# Patient Record
Sex: FEMALE | Race: WHITE | Employment: FULL TIME | URBAN - METROPOLITAN AREA
[De-identification: names, ages, dates, MRNs, and addresses within clinical notes are randomized per-mention and may not be internally consistent; named-entity substitution may affect disease eponyms.]

---

## 2019-07-31 ENCOUNTER — HOSPITAL ENCOUNTER (EMERGENCY)
Age: 39
Discharge: HOME | End: 2019-08-01
Attending: EMERGENCY MEDICINE
Payer: COMMERCIAL

## 2019-07-31 ENCOUNTER — APPOINTMENT (OUTPATIENT)
Dept: CT IMAGING | Age: 39
End: 2019-07-31
Attending: EMERGENCY MEDICINE
Payer: COMMERCIAL

## 2019-07-31 DIAGNOSIS — N83.8 OVARIAN MASS: Primary | ICD-10-CM

## 2019-07-31 DIAGNOSIS — R10.84 ABDOMINAL PAIN, GENERALIZED: ICD-10-CM

## 2019-07-31 LAB
ALBUMIN SERPL-MCNC: 3.8 G/DL (ref 3.5–5)
ALBUMIN/GLOB SERPL: 1 {RATIO} (ref 1.2–3.5)
ALP SERPL-CCNC: 87 U/L (ref 50–136)
ALT SERPL-CCNC: 34 U/L (ref 12–65)
ANION GAP SERPL CALC-SCNC: 9 MMOL/L (ref 7–16)
AST SERPL-CCNC: 30 U/L (ref 15–37)
BACTERIA URNS QL MICRO: 0 /HPF
BASOPHILS # BLD: 0.1 K/UL (ref 0–0.2)
BASOPHILS NFR BLD: 1 % (ref 0–2)
BILIRUB SERPL-MCNC: 0.4 MG/DL (ref 0.2–1.1)
BUN SERPL-MCNC: 10 MG/DL (ref 6–23)
CALCIUM SERPL-MCNC: 9.1 MG/DL (ref 8.3–10.4)
CASTS URNS QL MICRO: NORMAL /LPF
CHLORIDE SERPL-SCNC: 104 MMOL/L (ref 98–107)
CO2 SERPL-SCNC: 26 MMOL/L (ref 21–32)
CREAT SERPL-MCNC: 0.68 MG/DL (ref 0.6–1)
DIFFERENTIAL METHOD BLD: NORMAL
EOSINOPHIL # BLD: 0.1 K/UL (ref 0–0.8)
EOSINOPHIL NFR BLD: 1 % (ref 0.5–7.8)
EPI CELLS #/AREA URNS HPF: NORMAL /HPF
ERYTHROCYTE [DISTWIDTH] IN BLOOD BY AUTOMATED COUNT: 12 % (ref 11.9–14.6)
GLOBULIN SER CALC-MCNC: 4 G/DL (ref 2.3–3.5)
GLUCOSE SERPL-MCNC: 79 MG/DL (ref 65–100)
HCG UR QL: NEGATIVE
HCT VFR BLD AUTO: 42.6 % (ref 35.8–46.3)
HGB BLD-MCNC: 14 G/DL (ref 11.7–15.4)
IMM GRANULOCYTES # BLD AUTO: 0.1 K/UL (ref 0–0.5)
IMM GRANULOCYTES NFR BLD AUTO: 1 % (ref 0–5)
LIPASE SERPL-CCNC: 77 U/L (ref 73–393)
LYMPHOCYTES # BLD: 3.2 K/UL (ref 0.5–4.6)
LYMPHOCYTES NFR BLD: 31 % (ref 13–44)
MCH RBC QN AUTO: 30 PG (ref 26.1–32.9)
MCHC RBC AUTO-ENTMCNC: 32.9 G/DL (ref 31.4–35)
MCV RBC AUTO: 91.2 FL (ref 79.6–97.8)
MONOCYTES # BLD: 0.8 K/UL (ref 0.1–1.3)
MONOCYTES NFR BLD: 8 % (ref 4–12)
MUCOUS THREADS URNS QL MICRO: 0 /LPF
NEUTS SEG # BLD: 6.1 K/UL (ref 1.7–8.2)
NEUTS SEG NFR BLD: 59 % (ref 43–78)
NRBC # BLD: 0 K/UL (ref 0–0.2)
PLATELET # BLD AUTO: 273 K/UL (ref 150–450)
PMV BLD AUTO: 11.5 FL (ref 9.4–12.3)
POTASSIUM SERPL-SCNC: 3.9 MMOL/L (ref 3.5–5.1)
PROT SERPL-MCNC: 7.8 G/DL (ref 6.3–8.2)
RBC # BLD AUTO: 4.67 M/UL (ref 4.05–5.2)
RBC #/AREA URNS HPF: NORMAL /HPF
SODIUM SERPL-SCNC: 139 MMOL/L (ref 136–145)
WBC # BLD AUTO: 10.3 K/UL (ref 4.3–11.1)
WBC URNS QL MICRO: NORMAL /HPF

## 2019-07-31 PROCEDURE — 81003 URINALYSIS AUTO W/O SCOPE: CPT | Performed by: EMERGENCY MEDICINE

## 2019-07-31 PROCEDURE — 83690 ASSAY OF LIPASE: CPT

## 2019-07-31 PROCEDURE — 81025 URINE PREGNANCY TEST: CPT

## 2019-07-31 PROCEDURE — 81015 MICROSCOPIC EXAM OF URINE: CPT

## 2019-07-31 PROCEDURE — 74177 CT ABD & PELVIS W/CONTRAST: CPT

## 2019-07-31 PROCEDURE — 99284 EMERGENCY DEPT VISIT MOD MDM: CPT | Performed by: EMERGENCY MEDICINE

## 2019-07-31 PROCEDURE — 80053 COMPREHEN METABOLIC PANEL: CPT

## 2019-07-31 PROCEDURE — 74011636320 HC RX REV CODE- 636/320: Performed by: EMERGENCY MEDICINE

## 2019-07-31 PROCEDURE — 85025 COMPLETE CBC W/AUTO DIFF WBC: CPT

## 2019-07-31 PROCEDURE — 74011000258 HC RX REV CODE- 258: Performed by: EMERGENCY MEDICINE

## 2019-07-31 RX ORDER — TRAMADOL HYDROCHLORIDE 50 MG/1
50 TABLET ORAL
Qty: 20 TAB | Refills: 0 | Status: SHIPPED | OUTPATIENT
Start: 2019-07-31 | End: 2019-08-05

## 2019-07-31 RX ORDER — SODIUM CHLORIDE 0.9 % (FLUSH) 0.9 %
10 SYRINGE (ML) INJECTION
Status: COMPLETED | OUTPATIENT
Start: 2019-07-31 | End: 2019-07-31

## 2019-07-31 RX ADMIN — SODIUM CHLORIDE 100 ML: 900 INJECTION, SOLUTION INTRAVENOUS at 22:34

## 2019-07-31 RX ADMIN — Medication 10 ML: at 22:33

## 2019-07-31 RX ADMIN — IOPAMIDOL 100 ML: 755 INJECTION, SOLUTION INTRAVENOUS at 22:34

## 2019-08-01 VITALS
HEIGHT: 69 IN | BODY MASS INDEX: 31.1 KG/M2 | WEIGHT: 210 LBS | RESPIRATION RATE: 18 BRPM | HEART RATE: 80 BPM | TEMPERATURE: 98.6 F | OXYGEN SATURATION: 97 % | DIASTOLIC BLOOD PRESSURE: 76 MMHG | SYSTOLIC BLOOD PRESSURE: 123 MMHG

## 2019-08-01 NOTE — ED NOTES
I have reviewed discharge instructions with the patient and spouse. The patient and spouse verbalized understanding. Patient left ED via Discharge Method: ambulatory to Home with spouse    Opportunity for questions and clarification provided. Patient given 1 scripts. To continue your aftercare when you leave the hospital, you may receive an automated call from our care team to check in on how you are doing. This is a free service and part of our promise to provide the best care and service to meet your aftercare needs.  If you have questions, or wish to unsubscribe from this service please call [phone]. Thank you for Choosing our [facility] Emergency Department.

## 2019-08-01 NOTE — ED TRIAGE NOTES
Pt arrived from home with  via POV with c/o abdominal pain since December, progressively worse, painful for two weeks, no seeking tx. Pt went to , told they couldn't determine cause of pain. Pt states abdomen bloated, pain in the upper quadrant, denies NVD, no issues with bowel habits, no pain on urination. No OTC tx, has been taking Symone back and body x 3 out of the last 5 days. No other medical hx.

## 2019-08-01 NOTE — ED PROVIDER NOTES
The history is provided by the patient and the spouse. Abdominal Pain    This is a new problem. The current episode started more than 1 week ago. The problem occurs daily. The problem has been gradually worsening. The pain is associated with an unknown factor. The pain is located in the epigastric region and RUQ. The quality of the pain is aching and sharp. The pain is at a severity of 7/10. Pertinent negatives include no anorexia, no fever, no belching, no diarrhea, no flatus, no hematochezia, no melena, no nausea, no vomiting, no constipation, no dysuria, no frequency, no hematuria, no headaches, no arthralgias, no myalgias, no trauma, no chest pain and no back pain. The pain is worsened by activity and palpation. The pain is relieved by nothing. Her past medical history is significant for ovarian cysts (Patient was told by her gynecologist she had a large ovarian cyst but they have been following for several years that would grow and get smaller. She has not had no follow-up in at least 5 years. ). Her past medical history does not include PUD, gallstones, GERD, ulcerative colitis, Crohn's disease, irritable bowel syndrome, cancer, UTI, pancreatitis, ectopic pregnancy, diverticulitis, atrial fibrillation, DM, kidney stones or small bowel obstruction. The patient's surgical history non-contributory. No past medical history on file.     Past Surgical History:   Procedure Laterality Date    HX OTHER SURGICAL      wisdom teeth         Family History:   Problem Relation Age of Onset    Diabetes Maternal Grandmother     Heart Disease Maternal Grandmother     Diabetes Maternal Grandfather     Heart Disease Maternal Grandfather     Diabetes Paternal Grandmother     Diabetes Paternal Grandfather        Social History     Socioeconomic History    Marital status:      Spouse name: Not on file    Number of children: Not on file    Years of education: Not on file    Highest education level: Not on file   Occupational History    Not on file   Social Needs    Financial resource strain: Not on file    Food insecurity:     Worry: Not on file     Inability: Not on file    Transportation needs:     Medical: Not on file     Non-medical: Not on file   Tobacco Use    Smoking status: Never Smoker    Smokeless tobacco: Never Used   Substance and Sexual Activity    Alcohol use: No    Drug use: No    Sexual activity: Yes     Partners: Male     Birth control/protection: None   Lifestyle    Physical activity:     Days per week: Not on file     Minutes per session: Not on file    Stress: Not on file   Relationships    Social connections:     Talks on phone: Not on file     Gets together: Not on file     Attends Sikhism service: Not on file     Active member of club or organization: Not on file     Attends meetings of clubs or organizations: Not on file     Relationship status: Not on file    Intimate partner violence:     Fear of current or ex partner: Not on file     Emotionally abused: Not on file     Physically abused: Not on file     Forced sexual activity: Not on file   Other Topics Concern    Not on file   Social History Narrative    Not on file         ALLERGIES: Latex and Sulfa (sulfonamide antibiotics)    Review of Systems   Constitutional: Negative for fever. Cardiovascular: Negative for chest pain, palpitations and leg swelling. Gastrointestinal: Positive for abdominal distention and abdominal pain. Negative for anorexia, blood in stool, constipation, diarrhea, flatus, hematochezia, melena, nausea and vomiting. Genitourinary: Negative for dysuria, frequency and hematuria. Musculoskeletal: Negative for arthralgias, back pain and myalgias. Neurological: Negative for headaches. All other systems reviewed and are negative.       Vitals:    07/31/19 2039 07/31/19 2257 07/31/19 2259   BP: (!) 163/94 144/73 135/73   Pulse: 100 87 94   Resp: 20     Temp: 98.5 °F (36.9 °C)     SpO2: 99% 99% 97% Weight: 95.3 kg (210 lb)     Height: 5' 9\" (1.753 m)              Physical Exam   Constitutional: She is oriented to person, place, and time. She appears well-developed and well-nourished. No distress. HENT:   Head: Normocephalic and atraumatic. Right Ear: External ear normal.   Left Ear: External ear normal.   Mouth/Throat: Oropharynx is clear and moist.   Eyes: Pupils are equal, round, and reactive to light. Conjunctivae and EOM are normal.   Neck: Normal range of motion. Neck supple. Cardiovascular: Normal rate, regular rhythm, normal heart sounds and intact distal pulses. Pulmonary/Chest: Effort normal and breath sounds normal.   Abdominal: Soft. Bowel sounds are normal. She exhibits distension and mass. She exhibits no shifting dullness, no pulsatile liver, no fluid wave, no abdominal bruit, no ascites and no pulsatile midline mass. There is no hepatosplenomegaly. There is tenderness in the right upper quadrant and epigastric area. There is no rigidity, no rebound, no guarding, no CVA tenderness, no tenderness at McBurney's point and negative Mcadams's sign. No hernia. Musculoskeletal: Normal range of motion. She exhibits no edema. Neurological: She is alert and oriented to person, place, and time. Skin: Skin is warm and dry. Capillary refill takes less than 2 seconds. Psychiatric: She has a normal mood and affect. Nursing note and vitals reviewed.        MDM  Number of Diagnoses or Management Options  Ovarian mass: new and requires workup     Amount and/or Complexity of Data Reviewed  Clinical lab tests: ordered and reviewed  Tests in the radiology section of CPT®: ordered and reviewed  Review and summarize past medical records: yes  Discuss the patient with other providers: yes (Discussed findings with Dr. Nils Hunter of gynecology; he will notify the office to expect the patient's call in the morning and arrange follow-up.)  Independent visualization of images, tracings, or specimens: yes    Risk of Complications, Morbidity, and/or Mortality  Presenting problems: high  Diagnostic procedures: moderate  Management options: moderate    Patient Progress  Patient progress: stable         Procedures        Results Reviewed:      Recent Results (from the past 24 hour(s))   CBC WITH AUTOMATED DIFF    Collection Time: 07/31/19  8:47 PM   Result Value Ref Range    WBC 10.3 4.3 - 11.1 K/uL    RBC 4.67 4.05 - 5.2 M/uL    HGB 14.0 11.7 - 15.4 g/dL    HCT 42.6 35.8 - 46.3 %    MCV 91.2 79.6 - 97.8 FL    MCH 30.0 26.1 - 32.9 PG    MCHC 32.9 31.4 - 35.0 g/dL    RDW 12.0 11.9 - 14.6 %    PLATELET 251 798 - 596 K/uL    MPV 11.5 9.4 - 12.3 FL    ABSOLUTE NRBC 0.00 0.0 - 0.2 K/uL    DF AUTOMATED      NEUTROPHILS 59 43 - 78 %    LYMPHOCYTES 31 13 - 44 %    MONOCYTES 8 4.0 - 12.0 %    EOSINOPHILS 1 0.5 - 7.8 %    BASOPHILS 1 0.0 - 2.0 %    IMMATURE GRANULOCYTES 1 0.0 - 5.0 %    ABS. NEUTROPHILS 6.1 1.7 - 8.2 K/UL    ABS. LYMPHOCYTES 3.2 0.5 - 4.6 K/UL    ABS. MONOCYTES 0.8 0.1 - 1.3 K/UL    ABS. EOSINOPHILS 0.1 0.0 - 0.8 K/UL    ABS. BASOPHILS 0.1 0.0 - 0.2 K/UL    ABS. IMM. GRANS. 0.1 0.0 - 0.5 K/UL   METABOLIC PANEL, COMPREHENSIVE    Collection Time: 07/31/19  8:47 PM   Result Value Ref Range    Sodium 139 136 - 145 mmol/L    Potassium 3.9 3.5 - 5.1 mmol/L    Chloride 104 98 - 107 mmol/L    CO2 26 21 - 32 mmol/L    Anion gap 9 7 - 16 mmol/L    Glucose 79 65 - 100 mg/dL    BUN 10 6 - 23 MG/DL    Creatinine 0.68 0.6 - 1.0 MG/DL    GFR est AA >60 >60 ml/min/1.73m2    GFR est non-AA >60 >60 ml/min/1.73m2    Calcium 9.1 8.3 - 10.4 MG/DL    Bilirubin, total 0.4 0.2 - 1.1 MG/DL    ALT (SGPT) 34 12 - 65 U/L    AST (SGOT) 30 15 - 37 U/L    Alk.  phosphatase 87 50 - 136 U/L    Protein, total 7.8 6.3 - 8.2 g/dL    Albumin 3.8 3.5 - 5.0 g/dL    Globulin 4.0 (H) 2.3 - 3.5 g/dL    A-G Ratio 1.0 (L) 1.2 - 3.5     LIPASE    Collection Time: 07/31/19  8:47 PM   Result Value Ref Range    Lipase 77 73 - 393 U/L   URINE MICROSCOPIC    Collection Time: 07/31/19  9:02 PM   Result Value Ref Range    WBC 0-3 0 /hpf    RBC 0-3 0 /hpf    Epithelial cells 0-3 0 /hpf    Bacteria 0 0 /hpf    Casts 0-3 0 /lpf    Mucus 0 0 /lpf   HCG URINE, QL. - POC    Collection Time: 07/31/19  9:02 PM   Result Value Ref Range    Pregnancy test,urine (POC) NEGATIVE  NEG       CT ABD PELV W CONT   Final Result   IMPRESSION:      1. Large mixed cystic and solid mass occupying most of the abdomen (measuring up   to 30 cm), suspicious for cystic ovarian neoplasm. OB/GYN consultation is   recommended. 2. Uterus is not well evaluated on this study. 3. Trace free fluid. No free air. I discussed the results of all labs, procedures, radiographs, and treatments with the patient and available family. Treatment plan is agreed upon and the patient is ready for discharge. All voiced understanding of the discharge plan and medication instructions or changes as appropriate. Questions about treatment in the ED were answered. All were encouraged to return should symptoms worsen or new problems develop.

## 2019-08-02 NOTE — PROGRESS NOTES
Dr. Dawood Osborne spoke with Dr. Bree Villegas regarding patient. They were going to call patient in the am for follow up. Patient was informed of the results at the visit.

## 2019-08-12 ENCOUNTER — ANESTHESIA EVENT (OUTPATIENT)
Dept: SURGERY | Age: 39
DRG: 742 | End: 2019-08-12
Payer: COMMERCIAL

## 2019-08-12 ENCOUNTER — HOSPITAL ENCOUNTER (OUTPATIENT)
Dept: SURGERY | Age: 39
Discharge: HOME | End: 2019-08-12

## 2019-08-12 VITALS
DIASTOLIC BLOOD PRESSURE: 91 MMHG | WEIGHT: 212.44 LBS | HEART RATE: 84 BPM | TEMPERATURE: 98.1 F | BODY MASS INDEX: 33.34 KG/M2 | SYSTOLIC BLOOD PRESSURE: 141 MMHG | HEIGHT: 67 IN

## 2019-08-12 NOTE — PERIOP NOTES
Patient verified name and     Order for consent was not found in EHR and matches case posting; patient verified. Type 11 surgery, walk in  assessment complete. Labs per surgeon: none at present time;   Labs per anesthesia protocol: cbc and cmp from 2019 results are in connect care  Type and screen day of surgery    EKG: EKG is not required per protocol. Patient denies any history of CAD, chest pain or 77 [facility] approved surgical skin cleanser and instructions given per hospital policy. Patient provided with and instructed on educational handouts including Guide to Surgery, Pain Management, Hand Hygiene, Blood Transfusion Education, and [place] [facility] Brochure. Patient answered medical/surgical history questions at their best of ability. All prior to admission medications documented in [facility]. Original medication prescription bottle were not visualized during patient appointment. Patient instructed to hold all vitamins, MVI and Vitamin C 7 days prior to surgery and NSAIDS 5 days prior to surgery, patient verbalized understanding. Prescription medications to hold: none    Patient instructed to continue previous medications as prescribed prior to surgery and to take the following medications the day of surgery according to anesthesia guidelines with a small sip of water: none. Patient teach back successful and patient demonstrates knowledge of instructions.

## 2019-08-13 ENCOUNTER — ANESTHESIA (OUTPATIENT)
Dept: SURGERY | Age: 39
DRG: 742 | End: 2019-08-13
Payer: COMMERCIAL

## 2019-08-13 ENCOUNTER — HOSPITAL ENCOUNTER (INPATIENT)
Age: 39
LOS: 1 days | Discharge: HOME | DRG: 742 | End: 2019-08-14
Attending: OBSTETRICS & GYNECOLOGY | Admitting: OBSTETRICS & GYNECOLOGY
Payer: COMMERCIAL

## 2019-08-13 DIAGNOSIS — R52 PAIN: Primary | ICD-10-CM

## 2019-08-13 PROBLEM — D27.1 BENIGN NEOPLASM OF LEFT OVARY: Status: ACTIVE | Noted: 2019-08-13

## 2019-08-13 LAB
ABO + RH BLD: NORMAL
BLOOD GROUP ANTIBODIES SERPL: NORMAL
HCG UR QL: NEGATIVE
SPECIMEN EXP DATE BLD: NORMAL

## 2019-08-13 PROCEDURE — 77030019908 HC STETH ESOPH SIMS -A: Performed by: ANESTHESIOLOGY

## 2019-08-13 PROCEDURE — 74011250636 HC RX REV CODE- 250/636

## 2019-08-13 PROCEDURE — 88112 CYTOPATH CELL ENHANCE TECH: CPT

## 2019-08-13 PROCEDURE — 77030003028 HC SUT VCRL J&J -A: Performed by: OBSTETRICS & GYNECOLOGY

## 2019-08-13 PROCEDURE — 88302 TISSUE EXAM BY PATHOLOGIST: CPT

## 2019-08-13 PROCEDURE — 77030031139 HC SUT VCRL2 J&J -A: Performed by: OBSTETRICS & GYNECOLOGY

## 2019-08-13 PROCEDURE — 77030035574: Performed by: OBSTETRICS & GYNECOLOGY

## 2019-08-13 PROCEDURE — 0UT20ZZ RESECTION OF BILATERAL OVARIES, OPEN APPROACH: ICD-10-PCS | Performed by: OBSTETRICS & GYNECOLOGY

## 2019-08-13 PROCEDURE — 77030019927 HC TBNG IRR CYSTO BAXT -A: Performed by: OBSTETRICS & GYNECOLOGY

## 2019-08-13 PROCEDURE — 77030002966 HC SUT PDS J&J -A: Performed by: OBSTETRICS & GYNECOLOGY

## 2019-08-13 PROCEDURE — 77030036554: Performed by: OBSTETRICS & GYNECOLOGY

## 2019-08-13 PROCEDURE — 86900 BLOOD TYPING SEROLOGIC ABO: CPT

## 2019-08-13 PROCEDURE — C1758 CATHETER, URETERAL: HCPCS | Performed by: OBSTETRICS & GYNECOLOGY

## 2019-08-13 PROCEDURE — 77030010507 HC ADH SKN DERMBND J&J -B: Performed by: OBSTETRICS & GYNECOLOGY

## 2019-08-13 PROCEDURE — 74011250637 HC RX REV CODE- 250/637: Performed by: OBSTETRICS & GYNECOLOGY

## 2019-08-13 PROCEDURE — 0UT90ZZ RESECTION OF UTERUS, OPEN APPROACH: ICD-10-PCS | Performed by: OBSTETRICS & GYNECOLOGY

## 2019-08-13 PROCEDURE — 77030039266 HC ADH SKN EXOFIN S2SG -A: Performed by: OBSTETRICS & GYNECOLOGY

## 2019-08-13 PROCEDURE — 77030037088 HC TUBE ENDOTRACH ORAL NSL COVD-A: Performed by: ANESTHESIOLOGY

## 2019-08-13 PROCEDURE — 0UT70ZZ RESECTION OF BILATERAL FALLOPIAN TUBES, OPEN APPROACH: ICD-10-PCS | Performed by: OBSTETRICS & GYNECOLOGY

## 2019-08-13 PROCEDURE — 77030027744 HC PWDR HEMSTAT ARISTA ABSRB 5GM BARD -D: Performed by: OBSTETRICS & GYNECOLOGY

## 2019-08-13 PROCEDURE — 81025 URINE PREGNANCY TEST: CPT

## 2019-08-13 PROCEDURE — 77030039425 HC BLD LARYNG TRULITE DISP TELE -A: Performed by: ANESTHESIOLOGY

## 2019-08-13 PROCEDURE — 76060000036 HC ANESTHESIA 2.5 TO 3 HR: Performed by: OBSTETRICS & GYNECOLOGY

## 2019-08-13 PROCEDURE — 77030018832 HC SOL IRR H20 ICUM -A: Performed by: OBSTETRICS & GYNECOLOGY

## 2019-08-13 PROCEDURE — 77030034850: Performed by: OBSTETRICS & GYNECOLOGY

## 2019-08-13 PROCEDURE — 76010000172 HC OR TIME 2.5 TO 3 HR INTENSV-TIER 1: Performed by: OBSTETRICS & GYNECOLOGY

## 2019-08-13 PROCEDURE — 77030032490 HC SLV COMPR SCD KNE COVD -B: Performed by: OBSTETRICS & GYNECOLOGY

## 2019-08-13 PROCEDURE — 74011250636 HC RX REV CODE- 250/636: Performed by: ANESTHESIOLOGY

## 2019-08-13 PROCEDURE — 88305 TISSUE EXAM BY PATHOLOGIST: CPT

## 2019-08-13 PROCEDURE — 77030018846 HC SOL IRR STRL H20 ICUM -A: Performed by: OBSTETRICS & GYNECOLOGY

## 2019-08-13 PROCEDURE — 77030003029 HC SUT VCRL J&J -B: Performed by: OBSTETRICS & GYNECOLOGY

## 2019-08-13 PROCEDURE — 0DBU0ZZ EXCISION OF OMENTUM, OPEN APPROACH: ICD-10-PCS | Performed by: OBSTETRICS & GYNECOLOGY

## 2019-08-13 PROCEDURE — 88331 PATH CONSLTJ SURG 1 BLK 1SPC: CPT

## 2019-08-13 PROCEDURE — 77030019940 HC BLNKT HYPOTHRM STRY -B: Performed by: ANESTHESIOLOGY

## 2019-08-13 PROCEDURE — 0T9880Z DRAINAGE OF BILATERAL URETERS WITH DRAINAGE DEVICE, VIA NATURAL OR ARTIFICIAL OPENING ENDOSCOPIC: ICD-10-PCS | Performed by: OBSTETRICS & GYNECOLOGY

## 2019-08-13 PROCEDURE — 74011250637 HC RX REV CODE- 250/637: Performed by: ANESTHESIOLOGY

## 2019-08-13 PROCEDURE — 88307 TISSUE EXAM BY PATHOLOGIST: CPT

## 2019-08-13 PROCEDURE — 74011000250 HC RX REV CODE- 250

## 2019-08-13 PROCEDURE — 76210000017 HC OR PH I REC 1.5 TO 2 HR: Performed by: OBSTETRICS & GYNECOLOGY

## 2019-08-13 PROCEDURE — 77030011264 HC ELECTRD BLD EXT COVD -A: Performed by: OBSTETRICS & GYNECOLOGY

## 2019-08-13 PROCEDURE — 65270000029 HC RM PRIVATE

## 2019-08-13 PROCEDURE — 77030011278 HC ELECTRD LIG IMPT COVD -F: Performed by: OBSTETRICS & GYNECOLOGY

## 2019-08-13 PROCEDURE — 74011250636 HC RX REV CODE- 250/636: Performed by: OBSTETRICS & GYNECOLOGY

## 2019-08-13 RX ORDER — ONDANSETRON 2 MG/ML
8 INJECTION INTRAMUSCULAR; INTRAVENOUS
Status: DISCONTINUED | OUTPATIENT
Start: 2019-08-13 | End: 2019-08-14 | Stop reason: HOSPADM

## 2019-08-13 RX ORDER — METOCLOPRAMIDE 10 MG/1
10 TABLET ORAL
Status: DISCONTINUED | OUTPATIENT
Start: 2019-08-13 | End: 2019-08-14 | Stop reason: HOSPADM

## 2019-08-13 RX ORDER — HYDROCODONE BITARTRATE AND ACETAMINOPHEN 10; 325 MG/1; MG/1
1 TABLET ORAL
Qty: 30 TAB | Refills: 0 | Status: SHIPPED | OUTPATIENT
Start: 2019-08-13 | End: 2019-08-16

## 2019-08-13 RX ORDER — ONDANSETRON 4 MG/1
4 TABLET, ORALLY DISINTEGRATING ORAL
Qty: 10 TAB | Refills: 2 | Status: SHIPPED | OUTPATIENT
Start: 2019-08-13

## 2019-08-13 RX ORDER — ZOLPIDEM TARTRATE 5 MG/1
5 TABLET ORAL
Status: DISCONTINUED | OUTPATIENT
Start: 2019-08-13 | End: 2019-08-14 | Stop reason: HOSPADM

## 2019-08-13 RX ORDER — SIMETHICONE 80 MG
80 TABLET,CHEWABLE ORAL
Status: DISCONTINUED | OUTPATIENT
Start: 2019-08-13 | End: 2019-08-14 | Stop reason: HOSPADM

## 2019-08-13 RX ORDER — ONDANSETRON 2 MG/ML
INJECTION INTRAMUSCULAR; INTRAVENOUS AS NEEDED
Status: DISCONTINUED | OUTPATIENT
Start: 2019-08-13 | End: 2019-08-13 | Stop reason: HOSPADM

## 2019-08-13 RX ORDER — FENTANYL CITRATE 50 UG/ML
INJECTION, SOLUTION INTRAMUSCULAR; INTRAVENOUS AS NEEDED
Status: DISCONTINUED | OUTPATIENT
Start: 2019-08-13 | End: 2019-08-13 | Stop reason: HOSPADM

## 2019-08-13 RX ORDER — FLUMAZENIL 0.1 MG/ML
0.2 INJECTION INTRAVENOUS
Status: DISCONTINUED | OUTPATIENT
Start: 2019-08-13 | End: 2019-08-13 | Stop reason: HOSPADM

## 2019-08-13 RX ORDER — DIPHENHYDRAMINE HYDROCHLORIDE 50 MG/ML
12.5 INJECTION, SOLUTION INTRAMUSCULAR; INTRAVENOUS
Status: DISCONTINUED | OUTPATIENT
Start: 2019-08-13 | End: 2019-08-14 | Stop reason: HOSPADM

## 2019-08-13 RX ORDER — SODIUM CHLORIDE 0.9 % (FLUSH) 0.9 %
5-40 SYRINGE (ML) INJECTION EVERY 8 HOURS
Status: DISCONTINUED | OUTPATIENT
Start: 2019-08-13 | End: 2019-08-13 | Stop reason: HOSPADM

## 2019-08-13 RX ORDER — CEFAZOLIN SODIUM/WATER 2 G/20 ML
2 SYRINGE (ML) INTRAVENOUS ONCE
Status: DISPENSED | OUTPATIENT
Start: 2019-08-13 | End: 2019-08-14

## 2019-08-13 RX ORDER — DIPHENHYDRAMINE HYDROCHLORIDE 50 MG/ML
12.5 INJECTION, SOLUTION INTRAMUSCULAR; INTRAVENOUS
Status: DISCONTINUED | OUTPATIENT
Start: 2019-08-13 | End: 2019-08-13 | Stop reason: HOSPADM

## 2019-08-13 RX ORDER — HYDROMORPHONE HYDROCHLORIDE 2 MG/ML
0.5 INJECTION, SOLUTION INTRAMUSCULAR; INTRAVENOUS; SUBCUTANEOUS
Status: DISCONTINUED | OUTPATIENT
Start: 2019-08-13 | End: 2019-08-13 | Stop reason: HOSPADM

## 2019-08-13 RX ORDER — ACETAMINOPHEN 500 MG
1000 TABLET ORAL ONCE
Status: COMPLETED | OUTPATIENT
Start: 2019-08-13 | End: 2019-08-13

## 2019-08-13 RX ORDER — HYDROMORPHONE HYDROCHLORIDE 2 MG/ML
INJECTION, SOLUTION INTRAMUSCULAR; INTRAVENOUS; SUBCUTANEOUS AS NEEDED
Status: DISCONTINUED | OUTPATIENT
Start: 2019-08-13 | End: 2019-08-13 | Stop reason: HOSPADM

## 2019-08-13 RX ORDER — MIDAZOLAM HYDROCHLORIDE 1 MG/ML
2 INJECTION, SOLUTION INTRAMUSCULAR; INTRAVENOUS
Status: COMPLETED | OUTPATIENT
Start: 2019-08-13 | End: 2019-08-13

## 2019-08-13 RX ORDER — SODIUM CHLORIDE, SODIUM LACTATE, POTASSIUM CHLORIDE, CALCIUM CHLORIDE 600; 310; 30; 20 MG/100ML; MG/100ML; MG/100ML; MG/100ML
100 INJECTION, SOLUTION INTRAVENOUS CONTINUOUS
Status: DISCONTINUED | OUTPATIENT
Start: 2019-08-13 | End: 2019-08-13 | Stop reason: HOSPADM

## 2019-08-13 RX ORDER — MORPHINE SULFATE 2 MG/ML
2 INJECTION, SOLUTION INTRAMUSCULAR; INTRAVENOUS
Status: DISCONTINUED | OUTPATIENT
Start: 2019-08-13 | End: 2019-08-14 | Stop reason: HOSPADM

## 2019-08-13 RX ORDER — OXYCODONE HYDROCHLORIDE 5 MG/1
5 TABLET ORAL
Status: DISCONTINUED | OUTPATIENT
Start: 2019-08-13 | End: 2019-08-13 | Stop reason: HOSPADM

## 2019-08-13 RX ORDER — SODIUM CHLORIDE 0.9 % (FLUSH) 0.9 %
5-40 SYRINGE (ML) INJECTION AS NEEDED
Status: DISCONTINUED | OUTPATIENT
Start: 2019-08-13 | End: 2019-08-14 | Stop reason: HOSPADM

## 2019-08-13 RX ORDER — FENTANYL CITRATE 50 UG/ML
100 INJECTION, SOLUTION INTRAMUSCULAR; INTRAVENOUS ONCE
Status: DISCONTINUED | OUTPATIENT
Start: 2019-08-13 | End: 2019-08-13 | Stop reason: HOSPADM

## 2019-08-13 RX ORDER — SODIUM CHLORIDE 0.9 % (FLUSH) 0.9 %
5-40 SYRINGE (ML) INJECTION AS NEEDED
Status: DISCONTINUED | OUTPATIENT
Start: 2019-08-13 | End: 2019-08-13 | Stop reason: HOSPADM

## 2019-08-13 RX ORDER — LIDOCAINE HYDROCHLORIDE 10 MG/ML
0.1 INJECTION INFILTRATION; PERINEURAL AS NEEDED
Status: DISCONTINUED | OUTPATIENT
Start: 2019-08-13 | End: 2019-08-13 | Stop reason: HOSPADM

## 2019-08-13 RX ORDER — HEPARIN SODIUM 5000 [USP'U]/ML
5000 INJECTION, SOLUTION INTRAVENOUS; SUBCUTANEOUS ONCE
Status: COMPLETED | OUTPATIENT
Start: 2019-08-13 | End: 2019-08-13

## 2019-08-13 RX ORDER — SODIUM CHLORIDE 0.9 % (FLUSH) 0.9 %
5-40 SYRINGE (ML) INJECTION EVERY 8 HOURS
Status: DISCONTINUED | OUTPATIENT
Start: 2019-08-13 | End: 2019-08-14 | Stop reason: HOSPADM

## 2019-08-13 RX ORDER — ONDANSETRON 4 MG/1
4 TABLET, ORALLY DISINTEGRATING ORAL
Status: DISCONTINUED | OUTPATIENT
Start: 2019-08-13 | End: 2019-08-14 | Stop reason: HOSPADM

## 2019-08-13 RX ORDER — PROCHLORPERAZINE MALEATE 5 MG
5 TABLET ORAL
Status: DISCONTINUED | OUTPATIENT
Start: 2019-08-13 | End: 2019-08-14 | Stop reason: HOSPADM

## 2019-08-13 RX ORDER — NALOXONE HYDROCHLORIDE 0.4 MG/ML
0.2 INJECTION, SOLUTION INTRAMUSCULAR; INTRAVENOUS; SUBCUTANEOUS AS NEEDED
Status: DISCONTINUED | OUTPATIENT
Start: 2019-08-13 | End: 2019-08-13 | Stop reason: HOSPADM

## 2019-08-13 RX ORDER — CEFAZOLIN SODIUM/WATER 2 G/20 ML
2 SYRINGE (ML) INTRAVENOUS EVERY 8 HOURS
Status: COMPLETED | OUTPATIENT
Start: 2019-08-13 | End: 2019-08-14

## 2019-08-13 RX ORDER — AMOXICILLIN 250 MG
1 CAPSULE ORAL DAILY
Qty: 60 TAB | Refills: 0 | Status: SHIPPED | OUTPATIENT
Start: 2019-08-13

## 2019-08-13 RX ORDER — HYDROCODONE BITARTRATE AND ACETAMINOPHEN 10; 325 MG/1; MG/1
1 TABLET ORAL
Status: DISCONTINUED | OUTPATIENT
Start: 2019-08-13 | End: 2019-08-14 | Stop reason: HOSPADM

## 2019-08-13 RX ORDER — MIDAZOLAM HYDROCHLORIDE 1 MG/ML
2 INJECTION, SOLUTION INTRAMUSCULAR; INTRAVENOUS ONCE
Status: DISCONTINUED | OUTPATIENT
Start: 2019-08-13 | End: 2019-08-13 | Stop reason: HOSPADM

## 2019-08-13 RX ORDER — AMOXICILLIN 250 MG
2 CAPSULE ORAL DAILY
Status: DISCONTINUED | OUTPATIENT
Start: 2019-08-14 | End: 2019-08-14 | Stop reason: HOSPADM

## 2019-08-13 RX ORDER — OXYCODONE HYDROCHLORIDE 5 MG/1
10 TABLET ORAL
Status: COMPLETED | OUTPATIENT
Start: 2019-08-13 | End: 2019-08-13

## 2019-08-13 RX ORDER — NEOSTIGMINE METHYLSULFATE 1 MG/ML
INJECTION INTRAVENOUS AS NEEDED
Status: DISCONTINUED | OUTPATIENT
Start: 2019-08-13 | End: 2019-08-13 | Stop reason: HOSPADM

## 2019-08-13 RX ORDER — PROPOFOL 10 MG/ML
INJECTION, EMULSION INTRAVENOUS AS NEEDED
Status: DISCONTINUED | OUTPATIENT
Start: 2019-08-13 | End: 2019-08-13 | Stop reason: HOSPADM

## 2019-08-13 RX ORDER — ROCURONIUM BROMIDE 10 MG/ML
INJECTION, SOLUTION INTRAVENOUS AS NEEDED
Status: DISCONTINUED | OUTPATIENT
Start: 2019-08-13 | End: 2019-08-13 | Stop reason: HOSPADM

## 2019-08-13 RX ORDER — KETOROLAC TROMETHAMINE 30 MG/ML
INJECTION, SOLUTION INTRAMUSCULAR; INTRAVENOUS AS NEEDED
Status: DISCONTINUED | OUTPATIENT
Start: 2019-08-13 | End: 2019-08-13 | Stop reason: HOSPADM

## 2019-08-13 RX ORDER — GLYCOPYRROLATE 0.2 MG/ML
INJECTION INTRAMUSCULAR; INTRAVENOUS AS NEEDED
Status: DISCONTINUED | OUTPATIENT
Start: 2019-08-13 | End: 2019-08-13 | Stop reason: HOSPADM

## 2019-08-13 RX ORDER — DEXAMETHASONE SODIUM PHOSPHATE 4 MG/ML
INJECTION, SOLUTION INTRA-ARTICULAR; INTRALESIONAL; INTRAMUSCULAR; INTRAVENOUS; SOFT TISSUE AS NEEDED
Status: DISCONTINUED | OUTPATIENT
Start: 2019-08-13 | End: 2019-08-13 | Stop reason: HOSPADM

## 2019-08-13 RX ORDER — IBUPROFEN 400 MG/1
400 TABLET ORAL
Status: DISCONTINUED | OUTPATIENT
Start: 2019-08-13 | End: 2019-08-14 | Stop reason: HOSPADM

## 2019-08-13 RX ORDER — NALOXONE HYDROCHLORIDE 0.4 MG/ML
0.4 INJECTION, SOLUTION INTRAMUSCULAR; INTRAVENOUS; SUBCUTANEOUS AS NEEDED
Status: DISCONTINUED | OUTPATIENT
Start: 2019-08-13 | End: 2019-08-14 | Stop reason: HOSPADM

## 2019-08-13 RX ORDER — LIDOCAINE HYDROCHLORIDE 20 MG/ML
INJECTION, SOLUTION EPIDURAL; INFILTRATION; INTRACAUDAL; PERINEURAL AS NEEDED
Status: DISCONTINUED | OUTPATIENT
Start: 2019-08-13 | End: 2019-08-13 | Stop reason: HOSPADM

## 2019-08-13 RX ADMIN — FENTANYL CITRATE 25 MCG: 50 INJECTION, SOLUTION INTRAMUSCULAR; INTRAVENOUS at 15:30

## 2019-08-13 RX ADMIN — OXYCODONE HYDROCHLORIDE 10 MG: 5 TABLET ORAL at 17:35

## 2019-08-13 RX ADMIN — METOCLOPRAMIDE HYDROCHLORIDE 10 MG: 10 TABLET ORAL at 20:42

## 2019-08-13 RX ADMIN — GLYCOPYRROLATE 0.4 MG: 0.2 INJECTION INTRAMUSCULAR; INTRAVENOUS at 16:20

## 2019-08-13 RX ADMIN — FENTANYL CITRATE 25 MCG: 50 INJECTION, SOLUTION INTRAMUSCULAR; INTRAVENOUS at 15:03

## 2019-08-13 RX ADMIN — MIDAZOLAM HYDROCHLORIDE 2 MG: 1 INJECTION, SOLUTION INTRAMUSCULAR; INTRAVENOUS at 14:22

## 2019-08-13 RX ADMIN — ROCURONIUM BROMIDE 50 MG: 10 INJECTION, SOLUTION INTRAVENOUS at 14:32

## 2019-08-13 RX ADMIN — HYDROMORPHONE HYDROCHLORIDE 0.5 MG: 2 INJECTION, SOLUTION INTRAMUSCULAR; INTRAVENOUS; SUBCUTANEOUS at 16:08

## 2019-08-13 RX ADMIN — GLYCOPYRROLATE 0.1 MG: 0.2 INJECTION INTRAMUSCULAR; INTRAVENOUS at 16:31

## 2019-08-13 RX ADMIN — FENTANYL CITRATE 50 MCG: 50 INJECTION, SOLUTION INTRAMUSCULAR; INTRAVENOUS at 14:26

## 2019-08-13 RX ADMIN — HYDROMORPHONE HYDROCHLORIDE 0.25 MG: 2 INJECTION, SOLUTION INTRAMUSCULAR; INTRAVENOUS; SUBCUTANEOUS at 16:24

## 2019-08-13 RX ADMIN — Medication 10 ML: at 20:42

## 2019-08-13 RX ADMIN — HEPARIN SODIUM 5000 UNITS: 5000 INJECTION INTRAVENOUS; SUBCUTANEOUS at 13:05

## 2019-08-13 RX ADMIN — PROPOFOL 200 MG: 10 INJECTION, EMULSION INTRAVENOUS at 14:32

## 2019-08-13 RX ADMIN — HYDROMORPHONE HYDROCHLORIDE 0.5 MG: 2 INJECTION INTRAMUSCULAR; INTRAVENOUS; SUBCUTANEOUS at 17:29

## 2019-08-13 RX ADMIN — ONDANSETRON 4 MG: 2 INJECTION INTRAMUSCULAR; INTRAVENOUS at 15:54

## 2019-08-13 RX ADMIN — SODIUM CHLORIDE, SODIUM LACTATE, POTASSIUM CHLORIDE, AND CALCIUM CHLORIDE: 600; 310; 30; 20 INJECTION, SOLUTION INTRAVENOUS at 14:20

## 2019-08-13 RX ADMIN — DEXAMETHASONE SODIUM PHOSPHATE 4 MG: 4 INJECTION, SOLUTION INTRA-ARTICULAR; INTRALESIONAL; INTRAMUSCULAR; INTRAVENOUS; SOFT TISSUE at 15:00

## 2019-08-13 RX ADMIN — Medication 2 G: at 20:42

## 2019-08-13 RX ADMIN — FENTANYL CITRATE 25 MCG: 50 INJECTION, SOLUTION INTRAMUSCULAR; INTRAVENOUS at 15:42

## 2019-08-13 RX ADMIN — ACETAMINOPHEN 1000 MG: 500 TABLET, FILM COATED ORAL at 13:05

## 2019-08-13 RX ADMIN — LIDOCAINE HYDROCHLORIDE 100 MG: 20 INJECTION, SOLUTION EPIDURAL; INFILTRATION; INTRACAUDAL; PERINEURAL at 14:32

## 2019-08-13 RX ADMIN — SODIUM CHLORIDE, SODIUM LACTATE, POTASSIUM CHLORIDE, AND CALCIUM CHLORIDE 100 ML/HR: 600; 310; 30; 20 INJECTION, SOLUTION INTRAVENOUS at 13:05

## 2019-08-13 RX ADMIN — KETOROLAC TROMETHAMINE 30 MG: 30 INJECTION, SOLUTION INTRAMUSCULAR; INTRAVENOUS at 16:17

## 2019-08-13 RX ADMIN — NEOSTIGMINE METHYLSULFATE 3 MG: 1 INJECTION INTRAVENOUS at 16:20

## 2019-08-13 RX ADMIN — HYDROMORPHONE HYDROCHLORIDE 0.5 MG: 2 INJECTION, SOLUTION INTRAMUSCULAR; INTRAVENOUS; SUBCUTANEOUS at 15:56

## 2019-08-13 NOTE — PERIOP NOTES
PT stated at beginning of procedure that she was unable to remove umbilical piercing. PT is fine with surgeon removing ring with tools, will not be able to reinsert. PT acknowledges understanding.

## 2019-08-13 NOTE — ANESTHESIA POSTPROCEDURE EVALUATION
Procedure(s): HYSTERECTOMY ABDOMINAL TOTAL (SIRENA) BILATERAL SALPINGO OOPHORECTOMY/OMENTAL BIOPSY/ STAGING  CYSTOSCOPY BILATERAL URETERAL STENT INSERTION. general    Anesthesia Post Evaluation      Multimodal analgesia: multimodal analgesia used between 6 hours prior to anesthesia start to PACU discharge  Patient location during evaluation: PACU  Patient participation: complete - patient participated  Level of consciousness: awake and alert  Pain management: adequate  Airway patency: patent  Anesthetic complications: no  Cardiovascular status: acceptable and hemodynamically stable  Respiratory status: acceptable  Hydration status: acceptable        Vitals Value Taken Time   /83 8/13/2019  5:40 PM   Temp 36.8 °C (98.2 °F) 8/13/2019  5:01 PM   Pulse 101 8/13/2019  5:53 PM   Resp 16 8/13/2019  5:11 PM   SpO2 97 % 8/13/2019  5:53 PM   Vitals shown include unvalidated device data.

## 2019-08-13 NOTE — PROGRESS NOTES
Pre-surgery ministry of presence & prayer to demonstrate caring & concern, convey emotional & spiritual support.      Nahomy Candelaria MDiv,ThM,PhD

## 2019-08-13 NOTE — BRIEF OP NOTE
BRIEF OPERATIVE NOTE    Date of Procedure: 8/13/2019   Preoperative Diagnosis: Pelvic mass [R19.00]  Postoperative Diagnosis: Pelvic mass [R19.00]    Procedure(s): HYSTERECTOMY ABDOMINAL TOTAL (SIRENA) BILATERAL SALPINGO OOPHORECTOMY/OMENTAL BIOPSY/ STAGING  CYSTOSCOPY BILATERAL URETERAL STENT INSERTION  Surgeon(s) and Role:     Estela Albrecht MD - Primary         Surgical Assistant: jose fountain np. Required as first assist due to size of tumor/complexity of resection    Surgical Staff:  Circ-1: Navi Mcclure RN  Scrub Tech-1: Chuy Gloria: Tawnya Divine  Nurse Practitioner: Darell Aase, NP  Event Time In Time Out   Incision Start 1454    Incision Close       Anesthesia: General   Estimated Blood Loss: 250ml  Specimens:   ID Type Source Tests Collected by Time Destination   1 : right fallopian tube and ovary Frozen Section Fallopian Kiersten Kaufman MD 8/13/2019 1509 Pathology   2 : inthracolic omentum Fresh   Lesly Amaral MD 8/13/2019 1514 Pathology   3 : uterus, left fallopian tube and ovary Fresh Uterus with Fallopian Tube & Alden Robison MD 8/13/2019 1532 Pathology   1 : ascites  Fresh Abdominal Fluid  Lesly Amaral MD 8/13/2019 1507 Cytology      Findings: 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 Left ovary neoplasm. Normal bladder.   Complications: none  Implants: * No implants in log *

## 2019-08-13 NOTE — PROGRESS NOTES
Dual skin assessment along with Janette Crawford RN. Midline abdominal incision covered with gauze and an abdominal binder c/d/i. Skin otherwise intact.

## 2019-08-13 NOTE — ANESTHESIA PREPROCEDURE EVALUATION
Relevant Problems   No relevant active problems       Anesthetic History   No history of anesthetic complications            Review of Systems / Medical History  Patient summary reviewed and pertinent labs reviewed    Pulmonary  Within defined limits                 Neuro/Psych   Within defined limits           Cardiovascular                  Exercise tolerance: >4 METS     GI/Hepatic/Renal  Within defined limits              Endo/Other        Cancer (ovarian)     Other Findings            Physical Exam    Airway  Mallampati: I  TM Distance: 4 - 6 cm  Neck ROM: normal range of motion   Mouth opening: Normal     Cardiovascular    Rhythm: regular  Rate: normal         Dental         Pulmonary                 Abdominal         Other Findings            Anesthetic Plan    ASA: 2  Anesthesia type: general          Induction: Intravenous  Anesthetic plan and risks discussed with: Patient

## 2019-08-13 NOTE — PERIOP NOTES
TRANSFER - OUT REPORT:    Verbal report given to [facility] RN(name) on [employer]  being transferred to room 515(unit) for routine post - op       Report consisted of patients Situation, Background, Assessment and   Recommendations(SBAR). Information from the following report(s) SBAR, Kardex, OR Summary, Intake/Output and MAR was reviewed with the receiving nurse. Lines:   Peripheral IV 08/13/19 Right Hand (Active)   Site Assessment Clean, dry, & intact 8/13/2019  5:01 PM   Phlebitis Assessment 0 8/13/2019  5:01 PM   Infiltration Assessment 0 8/13/2019  5:01 PM   Dressing Status Clean, dry, & intact 8/13/2019  5:01 PM   Dressing Type Tape;Transparent 8/13/2019  5:01 PM   Hub Color/Line Status Pink; Infusing;Patent 8/13/2019  5:01 PM   Alcohol Cap Used No 8/13/2019  5:01 PM        Opportunity for questions and clarification was provided. Patient transported with:  2L/min O2 via NC    VTE prophylaxis orders have been written for [employer]. Patient and family given floor number and nurses name.

## 2019-08-14 VITALS
BODY MASS INDEX: 29.05 KG/M2 | TEMPERATURE: 99.2 F | DIASTOLIC BLOOD PRESSURE: 69 MMHG | RESPIRATION RATE: 18 BRPM | WEIGHT: 185.1 LBS | OXYGEN SATURATION: 97 % | HEART RATE: 90 BPM | SYSTOLIC BLOOD PRESSURE: 120 MMHG | HEIGHT: 67 IN

## 2019-08-14 LAB
ANION GAP SERPL CALC-SCNC: 4 MMOL/L (ref 7–16)
BASOPHILS # BLD: 0 K/UL (ref 0–0.2)
BASOPHILS NFR BLD: 0 % (ref 0–2)
BUN SERPL-MCNC: 7 MG/DL (ref 6–23)
CALCIUM SERPL-MCNC: 8.7 MG/DL (ref 8.3–10.4)
CHLORIDE SERPL-SCNC: 104 MMOL/L (ref 98–107)
CO2 SERPL-SCNC: 30 MMOL/L (ref 21–32)
CREAT SERPL-MCNC: 0.72 MG/DL (ref 0.6–1)
DIFFERENTIAL METHOD BLD: ABNORMAL
EOSINOPHIL # BLD: 0 K/UL (ref 0–0.8)
EOSINOPHIL NFR BLD: 0 % (ref 0.5–7.8)
ERYTHROCYTE [DISTWIDTH] IN BLOOD BY AUTOMATED COUNT: 11.8 % (ref 11.9–14.6)
GLUCOSE SERPL-MCNC: 120 MG/DL (ref 65–100)
HCT VFR BLD AUTO: 39.1 % (ref 35.8–46.3)
HGB BLD-MCNC: 12.8 G/DL (ref 11.7–15.4)
IMM GRANULOCYTES # BLD AUTO: 0.1 K/UL (ref 0–0.5)
IMM GRANULOCYTES NFR BLD AUTO: 0 % (ref 0–5)
LYMPHOCYTES # BLD: 2.2 K/UL (ref 0.5–4.6)
LYMPHOCYTES NFR BLD: 17 % (ref 13–44)
MCH RBC QN AUTO: 29.6 PG (ref 26.1–32.9)
MCHC RBC AUTO-ENTMCNC: 32.7 G/DL (ref 31.4–35)
MCV RBC AUTO: 90.3 FL (ref 79.6–97.8)
MONOCYTES # BLD: 1 K/UL (ref 0.1–1.3)
MONOCYTES NFR BLD: 8 % (ref 4–12)
NEUTS SEG # BLD: 9.6 K/UL (ref 1.7–8.2)
NEUTS SEG NFR BLD: 75 % (ref 43–78)
NRBC # BLD: 0 K/UL (ref 0–0.2)
PLATELET # BLD AUTO: 289 K/UL (ref 150–450)
PMV BLD AUTO: 11.3 FL (ref 9.4–12.3)
POTASSIUM SERPL-SCNC: 4.5 MMOL/L (ref 3.5–5.1)
RBC # BLD AUTO: 4.33 M/UL (ref 4.05–5.2)
SODIUM SERPL-SCNC: 138 MMOL/L (ref 136–145)
WBC # BLD AUTO: 12.9 K/UL (ref 4.3–11.1)

## 2019-08-14 PROCEDURE — 74011250636 HC RX REV CODE- 250/636: Performed by: OBSTETRICS & GYNECOLOGY

## 2019-08-14 PROCEDURE — 36415 COLL VENOUS BLD VENIPUNCTURE: CPT

## 2019-08-14 PROCEDURE — 80048 BASIC METABOLIC PNL TOTAL CA: CPT

## 2019-08-14 PROCEDURE — 74011250637 HC RX REV CODE- 250/637: Performed by: OBSTETRICS & GYNECOLOGY

## 2019-08-14 PROCEDURE — 85025 COMPLETE CBC W/AUTO DIFF WBC: CPT

## 2019-08-14 RX ADMIN — Medication 10 ML: at 05:29

## 2019-08-14 RX ADMIN — HYDROCODONE BITARTRATE AND ACETAMINOPHEN 1 TABLET: 10; 325 TABLET ORAL at 06:32

## 2019-08-14 RX ADMIN — METOCLOPRAMIDE HYDROCHLORIDE 10 MG: 10 TABLET ORAL at 06:32

## 2019-08-14 RX ADMIN — Medication 2 G: at 05:29

## 2019-08-14 RX ADMIN — SENNOSIDES, DOCUSATE SODIUM 2 TABLET: 50; 8.6 TABLET, FILM COATED ORAL at 07:52

## 2019-08-14 NOTE — PROGRESS NOTES
Della Lynn  Admission Date: 8/13/2019               Daily Progress Note: 8/14/2019    LAB  Recent Labs     08/14/19  0339   WBC 12.9*   HGB 12.8   HCT 39.1         K 4.5      CO2 30   BUN 7   CREA 0.72   *         Visit Vitals  /69 (BP 1 Location: Left arm, BP Patient Position: At rest)   Pulse 90   Temp 99.2 °F (37.3 °C)   Resp 18   Ht 5' 7\" (1.702 m)   Wt 185 lb 1.6 oz (84 kg)   LMP 08/01/2019 (Exact Date) Comment: Light bleeding   SpO2 97%   Breastfeeding?  No   BMI 28.99 kg/m²       Current Facility-Administered Medications   Medication Dose Route Frequency Provider Last Rate Last Dose    sodium chloride (NS) flush 5-40 mL  5-40 mL IntraVENous Q8H Creasie Eisenmenger, MD   10 mL at 08/14/19 0529    sodium chloride (NS) flush 5-40 mL  5-40 mL IntraVENous PRN Creasie Eisenmenger, MD        ibuprofen (MOTRIN) tablet 400 mg  400 mg Oral Q6H PRN Creasie Eisenmenger, MD        HYDROcodone-acetaminophen [facility])  mg tablet 1 Tab  1 Tab Oral Q4H PRN Creasie Eisenmenger, MD   1 Tab at 08/14/19 0019    morphine injection 2 mg  2 mg IntraVENous Q4H PRN Creasie Eisenmenger, MD        naloxone [facility]) injection 0.4 mg  0.4 mg IntraVENous PRN Creasie Eisenmenger, MD        ondansetron (ZOFRAN ODT) tablet 4 mg  4 mg Oral Q4H PRN Creasie Eisenmenger, MD        diphenhydrAMINE (BENADRYL) injection 12.5 mg  12.5 mg IntraVENous Q4H PRN Creasie Eisenmenger, MD        senna-docusate (PERICOLACE) 8.6-50 mg per tablet 2 Tab  2 Tab Oral DAILY Creasie Eisenmenger, MD        zolpidem [facility]) tablet 5 mg  5 mg Oral QHS PRN Creasie Eisenmenger, MD        prochlorperazine (COMPAZINE) tablet 5 mg  5 mg Oral Q6H PRN Creasie Eisenmenger, MD        metoclopramide HCl (REGLAN) tablet 10 mg  10 mg Oral AC&HS Creasie Eisenmenger, MD   10 mg at 08/14/19 3432    simethicone (MYLICON) tablet 80 mg  80 mg Oral QID PRN Creasie Eisenmenger, MD        ondansetron [facility]) injection 8 mg  8 mg IntraVENous Q6H PRN Creasie Eisenmenger, MD           Allergies Allergen Reactions    Latex Unknown (comments)     sensitivity    Sulfa (Sulfonamide Antibiotics) Unknown (comments) and Nausea and Vomiting       NOTE  No issues  aox 3  nad  rrr  ctab  abd soft  Inc cdi  pod1  dc    Karlie Maher MD  8/14/2019

## 2019-08-14 NOTE — PROGRESS NOTES
END OF SHIFT NOTE:    Intake/Output  08/13 1901 - 08/14 0700  In: -   Out: 5614 [Urine:1550]   Voiding: YES  Catheter: NO  Drain:              Stool:  0 occurrences. Emesis:  0 occurrences. VITAL SIGNS  Patient Vitals for the past 12 hrs:   Temp Pulse Resp BP SpO2   08/14/19 0305 98.6 °F (37 °C) 71 18 122/66 99 %   08/13/19 2335 98.2 °F (36.8 °C) (!) 112 18 129/70 96 %   08/13/19 1904 98.2 °F (36.8 °C) 99 18 138/87 96 %   08/13/19 1812  90   96 %   08/13/19 1811 97.9 °F (36.6 °C) 93 18 127/72 96 %   08/13/19 1805  89   94 %       Pain Assessment  Pain 1  Pain Scale 1: Numeric (0 - 10) (08/14/19 0258)  Pain Intensity 1: 0 (08/14/19 0258)  Patient Stated Pain Goal: 0 (08/14/19 0258)  Pain Reassessment 1: Patient resting w/respiratory rate greater than 10 (08/14/19 0000)  Pain Onset 1: post op (08/13/19 2322)  Pain Location 1: Abdomen (08/13/19 2322)  Pain Orientation 1: Right (08/13/19 2322)  Pain Description 1: Aching; Sore (08/13/19 2322)  Pain Intervention(s) 1: Medication (see MAR)(admission database) (08/13/19 2322)    Ambulating  Yes    Additional Information: Patient rested well. Pulled jurado around 1945 and has voided several times. Complained of no pain all night. VSS. No needs voiced. Shift report given to oncoming nurse at the bedside.     Ova Cake

## 2019-08-14 NOTE — PROGRESS NOTES
Chart screened by  for discharge planning. No needs identified at this time. Please consult  if any new issues arise    Care Management Interventions  PCP Verified by CM:  Yes  Mode of Transport at Discharge: Self  Transition of Care Consult (CM Consult): Discharge Planning  Discharge Durable Medical Equipment: No  Physical Therapy Consult: No  Occupational Therapy Consult: No  Speech Therapy Consult: No  Current Support Network: Own Home, Family Lives Nearby  Confirm Follow Up Transport: Family  Plan discussed with Pt/Family/Caregiver: Yes  Freedom of Choice Offered: Yes  [address] Provided?: No  Discharge Location  Discharge Placement: Home

## 2019-08-14 NOTE — OP NOTES
[facility]  OPERATIVE REPORT    Name:  Nj Ochoa  MR#:  [id number]  :  1980  ACCOUNT #:  [de-identified]  DATE OF SERVICE:  2019      PREOPERATIVE DIAGNOSIS:  Large pelvic abdominal mass. 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 PROCEDURES PERFORMED:  Cystoscopy with bilateral intraoperative ureteral catheters, total abdominal hysterectomy, bilateral salpingo-oophorectomy, infracolic omentectomy. SURGEON:  Serina Tate MD    ASSISTANT:  Clifton Salter NP. First assistant was required due to the size of the tumor and complexity of the surgery. ANESTHESIA:  geta    ESTIMATED BLOOD LOSS:  250 mL. PACKS:  None. DRAINS:  Straight Dick catheter. COMPLICATIONS:  None. SPECIMENS REMOVED/PATHOLOGY:  Above and ascites. DISPOSITION:  PACU. INDICATIONS:  The patient is referred to the office with   imaging showed a very large mass as described above. Risks, benefits, alternatives, morbidities, and mortalities including premature menopause along with the issues with a large tumor reviewed and she wished to proceed. FINDINGS:  No evidence of metastatic disease. The tumor was removed intact. The other adnexa and the uterus appeared normal.  The appendix was normal.  The bowel was normal.  No adenopathy. Ureters normal pre and postprocedure. The bladder appeared normal.    PROCEDURE:  The patient was taken to the operating room and placed under general anesthesia, sterilely prepped and draped. Cystoscopy was performed. A 5-Jordanian ureteral catheters were placed bilaterally without difficulty. A Dick catheter was placed. A vertical incision was made above the umbilicus. The size of the tumor was noted. The ascites was taken for cytology. The incision was increased until the tumor could be exteriorized.   Once this was accomplished, the pedicle was isolated, triple clamped, incised and the specimen was removed intact from the field. An Beau retractor was placed. Infracolic omentectomy was performed with LigaSure to maintain hemostasis. The bowel was protected and the uterus was grasped bilaterally with the Pamela clamps. The retroperitoneal spaces were opened. The ureters were identified. Window was made between ureter and IP. The IPs were clamped, incised with a LigaSure and then ligature was placed proximally. The anterior cul-de-sac was incised. Bladder flap was created sharply below the external cervical os. On the right side, the IP pedicle was revised of the ovary and adnexa had previously been removed as the frozen. The uterine vessels were skeletonized. They were grasped at the level of the internal os. Pedicles created, incised, suture ligatures placed. This was continued to the external cervical os. Clamps were placed across the vaginal apex. Specimen was removed. [place] closed laterally  midline with figure-of-eight sutures. Irrigation was performed. Hemostasis was noted in all operative sites. The frozen section came back as a benign tumor. The bowel was allowed to fall to its normal location after Oscar was placed over the denuded pelvis. The anterior abdominal wall was closed with modified Smead-Saldaña closure. Subcutaneous tissue was irrigated. Hemostasis obtained, reapproximated with subcuticular Vicryl and Dermabond placed. Sponge, lap, needle counts were correct x2. The patient tolerated procedure well and was taken to PACU stable per Anesthesia.       MD MARYLIN [email or web address]  D:  08/13/2019 16:27  T:  08/13/2019 16:32  JOB #:  [id number]

## 2019-08-14 NOTE — DISCHARGE INSTRUCTIONS
Activity: No sex, douching, or tampons for 6 weeks or as directed by your physician. No heavy lifting for 6 weeks. No driving while taking pain medication. Diet: Resume pre-hospital diet  Wound Care: As directed    Learning About Hysterectomy Surgery  What is a hysterectomy? A hysterectomy is surgery to take out the uterus. Sometimes the ovaries and fallopian tubes also are removed at the same time. How is this surgery done? There are many ways to do the surgery. The type you have depends on your medical condition. It also depends on your overall health. Talk with your doctor about which type is right for you. Abdominal surgery  This is done through a cut, called an incision, that the doctor makes in the lower belly. Your doctor may do surgery this way if:  · You have a lot of scar tissue around your uterus. · Your uterus is very large. · You have cancer of the uterus or cervix. · Your doctor needs to check the area around your uterus for problems. Vaginal surgery  This is done through the vagina. Your doctor may do surgery this way if:  · Your uterus is normal in size. The smaller size means it can be removed easily. · You do not have endometriosis. (It is a problem that causes tissue in the uterus to grow in other parts of the body.)  · You do not have cancer of the uterus or cervix. Laparoscopically assisted vaginal surgery  This is done through the vagina and one or more small cuts in the belly. The doctor will insert a laparoscope and special tools through the cuts in the belly. The scope and tools help free the uterus. Then it is removed through the vagina. Laparoscopic supracervical surgery  This is done through several small cuts in the belly. The doctor inserts a scope and special tools through the cuts in the belly. He or she takes out the uterus in small pieces through the cuts. The cervix is left in place. This is an option when cancer of the cervix is not a concern.   In rare cases, during a vaginal or laparoscopic surgery, the doctor may find a problem that makes abdominal surgery a better choice. If this happens, the doctor will take out the uterus through a cut in the lower belly. What can you expect after your surgery? Recovery can take from 4 to 6 weeks. It depends on which type of surgery you have. You will need help around the house while you get better. You won't be able to do any heavy lifting. And you will have to take it easy for a few weeks. It is normal to feel more tired than usual. You also may notice that your emotions go up and down more than usual for a while. After surgery, you will no longer have periods. You will not be able to get pregnant. If there is a chance that you will want to have a baby in the future, talk to your doctor about other treatment options. The surgery should not lower your interest in sex after you have healed. In fact, some women enjoy sex more. They no longer have to worry about birth control or heavy bleeding. Some women have vaginal dryness after the surgery. It can make sex less comfortable. A vaginal lubricant, such as Astroglide or K-Y Jelly, can help. You may need to take hormone pills after your surgery if your ovaries are removed and you have not gone through menopause. Taking out the ovaries before menopause causes a sudden drop in the hormone estrogen. This increases your risk of getting weak and brittle bones (osteoporosis). Although estrogen therapy lowers this risk, it does slightly raise the risk of some other problems. These include breast cancer and stroke. Talk with your doctor about the pros and cons of taking estrogen if you have your ovaries removed. Follow-up care is a key part of your treatment and safety. Be sure to make and go to all appointments, and call your doctor if you are having problems. It is also a good idea to know your test results and keep a list of the medicines you take. Where can you learn more?   Go to http://sacha-doreen.info/. Enter [id number] [id number] in the search box to learn more about \"Learning About Hysterectomy Surgery. \"  Current as of: February 19, 2019  Content Version: 12.1  © 9432-9088 [employer]. Care instructions adapted under license by [employer] (which disclaims liability or warranty for this information). If you have questions about a medical condition or this instruction, always ask your healthcare professional. [place] [address] any warranty or liability for your use of this information. PATIENT INSTRUCTIONS:    After general anesthesia or intravenous sedation, for 24 hours or while taking prescription Narcotics:  · Limit your activities  · Do not drive and operate hazardous machinery  · Do not make important personal or business decisions  · Do  not drink alcoholic beverages  · If you have not urinated within 8 hours after discharge, please contact your surgeon on call. Report the following to your surgeon:  · Excessive pain, swelling, redness or odor of or around the surgical area  · Temperature over 100.5  · Nausea and vomiting lasting longer than 4 hours or if unable to take medications  · Any signs of decreased circulation or nerve impairment to extremity: change in color, persistent  numbness, tingling, coldness or increase pain  · Any questions    What to do at Home:  Recommended activity: Activity as tolerated. If you experience any of the following symptoms:  Fever greater than 100.5,  Bleeding more than a regular period,  Uncontrolled pain or nausea/ vomiting,   please follow up with your doctor. *  Please give a list of your current medications to your Primary Care Provider. *  Please update this list whenever your medications are discontinued, doses are      changed, or new medications (including over-the-counter products) are added.     *  Please carry medication information at all times in case of emergency situations. These are general instructions for a healthy lifestyle:    No smoking/ No tobacco products/ Avoid exposure to second hand smoke  Surgeon General's Warning:  Quitting smoking now greatly reduces serious risk to your health. Obesity, smoking, and sedentary lifestyle greatly increases your risk for illness    A healthy diet, regular physical exercise & weight monitoring are important for maintaining a healthy lifestyle    You may be retaining fluid if you have a history of heart failure or if you experience any of the following symptoms:  Weight gain of 3 pounds or more overnight or 5 pounds in a week, increased swelling in our hands or feet or shortness of breath while lying flat in bed. Please call your doctor as soon as you notice any of these symptoms; do not wait until your next office visit. The discharge information has been reviewed with the patient. The patient verbalized understanding. Discharge medications reviewed with the patient and appropriate educational materials and side effects teaching were provided.   ___________________________________________________________________________________________________________________________________

## 2019-08-14 NOTE — DISCHARGE SUMMARY
Gynecology Surgical Discharge Summary     Name: Andrew Dean MRN: [id number]  SSN: [id number]    YOB: 1980  Age: 37 y.o. 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 Hospital Course: Normal hospital course for this procedure.     Significant Diagnostic Studies:   Recent Results (from the past 24 hour(s))   TYPE & SCREEN    Collection Time: 08/13/19  1:07 PM   Result Value Ref Range    Crossmatch Expiration 08/16/2019     ABO/Rh(D) A POSITIVE     Antibody screen NEG    HCG URINE, QL. - POC    Collection Time: 08/13/19  1:25 PM   Result Value Ref Range    Pregnancy test,urine (POC) NEGATIVE  NEG     METABOLIC PANEL, BASIC    Collection Time: 08/14/19  3:39 AM   Result Value Ref Range    Sodium 138 136 - 145 mmol/L    Potassium 4.5 3.5 - 5.1 mmol/L    Chloride 104 98 - 107 mmol/L    CO2 30 21 - 32 mmol/L    Anion gap 4 (L) 7 - 16 mmol/L    Glucose 120 (H) 65 - 100 mg/dL    BUN 7 6 - 23 MG/DL    Creatinine 0.72 0.6 - 1.0 MG/DL    GFR est AA >60 >60 ml/min/1.73m2    GFR est non-AA >60 >60 ml/min/1.73m2    Calcium 8.7 8.3 - 10.4 MG/DL   CBC WITH AUTOMATED DIFF    Collection Time: 08/14/19  3:39 AM   Result Value Ref Range    WBC 12.9 (H) 4.3 - 11.1 K/uL    RBC 4.33 4.05 - 5.2 M/uL    HGB 12.8 11.7 - 15.4 g/dL    HCT 39.1 35.8 - 46.3 %    MCV 90.3 79.6 - 97.8 FL    MCH 29.6 26.1 - 32.9 PG    MCHC 32.7 31.4 - 35.0 g/dL    RDW 11.8 (L) 11.9 - 14.6 %    PLATELET 745 166 - 038 K/uL    MPV 11.3 9.4 - 12.3 FL    ABSOLUTE NRBC 0.00 0.0 - 0.2 K/uL    DF AUTOMATED      NEUTROPHILS 75 43 - 78 %    LYMPHOCYTES 17 13 - 44 %    MONOCYTES 8 4.0 - 12.0 %    EOSINOPHILS 0 (L) 0.5 - 7.8 %    BASOPHILS 0 0.0 - 2.0 %    IMMATURE GRANULOCYTES 0 0.0 - 5.0 %    ABS. NEUTROPHILS 9.6 (H) 1.7 - 8.2 K/UL    ABS. LYMPHOCYTES 2.2 0.5 - 4.6 K/UL    ABS. MONOCYTES 1.0 0.1 - 1.3 K/UL    ABS. EOSINOPHILS 0.0 0.0 - 0.8 K/UL    ABS. BASOPHILS 0.0 0.0 - 0.2 K/UL    ABS. IMM. GRANS. 0.1 0.0 - 0.5 K/UL       Patient Instructions:   Current Discharge Medication List      START taking these medications    Details   HYDROcodone-acetaminophen (NORCO)  mg tablet Take 1 Tab by mouth every six (6) hours as needed for Pain for up to 3 days. Max Daily Amount: 4 Tabs. Qty: 30 Tab, Refills: 0    Associated Diagnoses: Pain      ondansetron (ZOFRAN ODT) 4 mg disintegrating tablet Take 1 Tab by mouth every eight (8) hours as needed for Nausea. Qty: 10 Tab, Refills: 2      senna-docusate (PERICOLACE) 8.6-50 mg per tablet Take 1 Tab by mouth daily. Qty: 60 Tab, Refills: 0         CONTINUE these medications which have NOT CHANGED    Details   multivit/iron/FA/K/herb no. 244 (ALIVE WOMEN'S ENERGY PO) Take  by mouth daily. ascorbic acid (VITAMIN C) 1,000 mg tablet Take  by mouth. Activity: No sex, douching, or tampons for 6 weeks or as directed by your physician. No heavy lifting for 6 weeks. No driving while taking pain medication. Diet: Resume pre-hospital diet  Wound Care: As directed    Follow-up Appointments   Procedures    FOLLOW UP VISIT Appointment in: One Week 8 am Thursday next week     8 am Thursday next week     Standing Status:   Standing     Number of Occurrences:   1     Order Specific Question:   Appointment in     Answer:    One Week        Signed By:  Karlie Maher MD     August 14, 2019

## 2019-08-14 NOTE — PROGRESS NOTES
Pt is for discharge home today with no needs/supportive care orders received for CM at this time. Pt will have close follow up at the [address] [place] [address] [place] Management Interventions  PCP Verified by CM:  Yes  Mode of Transport at Discharge: Self  Transition of Care Consult (CM Consult): Discharge Planning  Discharge Durable Medical Equipment: No  Physical Therapy Consult: No  Occupational Therapy Consult: No  Speech Therapy Consult: No  Current Support Network: Own Home, Family Lives Nearby  Confirm Follow Up Transport: Family  Plan discussed with Pt/Family/Caregiver: Yes  Freedom of Choice Offered: Yes  [address] Provided?: No  Discharge Location  Discharge Placement: Home

## 2019-09-17 ENCOUNTER — HOSPITAL ENCOUNTER (OUTPATIENT)
Dept: LAB | Age: 39
Discharge: HOME | End: 2019-09-17

## 2019-09-17 PROCEDURE — 88305 TISSUE EXAM BY PATHOLOGIST: CPT

## (undated) DEVICE — SOLUTION IRRIG 3000ML H2O STRL BAG

## (undated) DEVICE — BINDER ABD M/L H12IN FOR 46-62IN WHT 4 SLD PNL DSGN HOOP

## (undated) DEVICE — DRAPE,UNDERBUTTOCKS,PCH,STERILE: Brand: MEDLINE

## (undated) DEVICE — CONTAINER SPEC HISTOLOGY 900ML POLYPR

## (undated) DEVICE — WOUND RETRACTOR AND PROTECTOR: Brand: ALEXIS O WOUND PROTECTOR-RETRACTOR

## (undated) DEVICE — TRAY CATH OD16FR SIL URIN M STATLOK STBL DEV SURSTP

## (undated) DEVICE — SURGICAL PROCEDURE PACK BASIC ST FRANCIS

## (undated) DEVICE — SPONGE GZ W4XL4IN COT 12 PLY TYP VII WVN C FLD DSGN

## (undated) DEVICE — INTENDED FOR TISSUE SEPARATION, AND OTHER PROCEDURES THAT REQUIRE A SHARP SURGICAL BLADE TO PUNCTURE OR CUT.: Brand: BARD-PARKER ® STAINLESS STEEL BLADES

## (undated) DEVICE — SUTURE ABSORBABLE BRAIDED 0 CT-1 8X27 IN UD VICRYL JJ41G

## (undated) DEVICE — SUTURE VCRL SZ 4-0 L27IN ABSRB UD L19MM PS-2 3/8 CIR PRIM J426H

## (undated) DEVICE — DRAPE TWL SURG 16X26IN BLU ORB04] ALLCARE INC]

## (undated) DEVICE — ABDOMINAL PAD: Brand: DERMACEA

## (undated) DEVICE — SUTURE VCRL SZ 2-0 L54IN ABSRB VLT W/O NDL POLYGLACTIN 910 J618H

## (undated) DEVICE — TRAY PREP DRY W/ PREM GLV 2 APPL 6 SPNG 2 UNDPD 1 OVERWRAP

## (undated) DEVICE — CYSTO/BLADDER IRRIGATION SET, REGULATING CLAMP

## (undated) DEVICE — BUTTON SWITCH PENCIL BLADE ELECTRODE, HOLSTER: Brand: EDGE

## (undated) DEVICE — REM POLYHESIVE ADULT PATIENT RETURN ELECTRODE: Brand: VALLEYLAB

## (undated) DEVICE — CURVED, LARGE JAW, OPEN SEALER/DIVIDER NANO-COATED: Brand: LIGASURE IMPACT

## (undated) DEVICE — SUTURE COAT VCRL SZ 0 L18IN ABSRB UD W/O NDL POLYGLACTIN J112T

## (undated) DEVICE — CYSTO: Brand: MEDLINE INDUSTRIES, INC.

## (undated) DEVICE — 2000CC GUARDIAN II: Brand: GUARDIAN

## (undated) DEVICE — SPONGE LAP 18X18IN STRL -- 5/PK

## (undated) DEVICE — SUTURE PDS II SZ 1 L96IN ABSRB VLT TP-1 L65MM 1/2 CIR Z880G

## (undated) DEVICE — (D)PREP SKN CHLRAPRP APPL 26ML -- CONVERT TO ITEM 371833

## (undated) DEVICE — SOLUTION IRRIG 1000ML H2O STRL BLT

## (undated) DEVICE — SHEET, T, LAPAROTOMY, STERILE: Brand: MEDLINE

## (undated) DEVICE — PERI-PAD,MODERATE: Brand: CURITY

## (undated) DEVICE — GAUZE,SPONGE,4"X4",12PLY,WOVEN,NS,LF: Brand: MEDLINE

## (undated) DEVICE — APPLICATOR BNDG 1MM ADH PREMIERPRO EXOFIN

## (undated) DEVICE — AGENT HEMSTAT 5GM ARISTA AH

## (undated) DEVICE — KENDALL SCD EXPRESS SLEEVES, KNEE LENGTH, MEDIUM: Brand: KENDALL SCD

## (undated) DEVICE — DERMABOND SKIN ADH 0.7ML -- DERMABOND ADVANCED 12/BX

## (undated) DEVICE — DRAPE,T,LAPARO,TRANS,STERILE: Brand: MEDLINE

## (undated) DEVICE — BLADE ELECTRODE: Brand: EDGE

## (undated) DEVICE — CATH URET 5FRX70CM W/OPN END -- BX/20

## (undated) DEVICE — BASIC SINGLE BASIN 1-LF: Brand: MEDLINE INDUSTRIES, INC.